# Patient Record
Sex: FEMALE
[De-identification: names, ages, dates, MRNs, and addresses within clinical notes are randomized per-mention and may not be internally consistent; named-entity substitution may affect disease eponyms.]

---

## 2022-07-20 ENCOUNTER — HOSPITAL ENCOUNTER (OUTPATIENT)
Dept: HOSPITAL 5 - SLR | Age: 37
Discharge: HOME | End: 2022-07-20
Attending: INTERNAL MEDICINE
Payer: COMMERCIAL

## 2022-07-20 DIAGNOSIS — G47.30: Primary | ICD-10-CM

## 2022-07-20 PROCEDURE — G0399 HOME SLEEP TEST/TYPE 3 PORTA: HCPCS

## 2024-06-19 ENCOUNTER — DASHBOARD ENCOUNTERS (OUTPATIENT)
Age: 39
End: 2024-06-19

## 2024-06-19 ENCOUNTER — OFFICE VISIT (OUTPATIENT)
Dept: URBAN - METROPOLITAN AREA CLINIC 92 | Facility: CLINIC | Age: 39
End: 2024-06-19
Payer: COMMERCIAL

## 2024-06-19 VITALS
HEART RATE: 77 BPM | BODY MASS INDEX: 43.4 KG/M2 | TEMPERATURE: 97 F | HEIGHT: 64 IN | SYSTOLIC BLOOD PRESSURE: 124 MMHG | DIASTOLIC BLOOD PRESSURE: 87 MMHG | WEIGHT: 254.2 LBS

## 2024-06-19 DIAGNOSIS — K62.5 RECTAL BLEEDING: ICD-10-CM

## 2024-06-19 DIAGNOSIS — K59.09 CHRONIC CONSTIPATION: ICD-10-CM

## 2024-06-19 PROCEDURE — 99204 OFFICE O/P NEW MOD 45 MIN: CPT | Performed by: INTERNAL MEDICINE

## 2024-06-19 RX ORDER — SEMAGLUTIDE 2.68 MG/ML
AS DIRECTED INJECTION, SOLUTION SUBCUTANEOUS
Status: ACTIVE | COMMUNITY

## 2024-06-19 RX ORDER — NAPROXEN SODIUM 220 MG/1
1 TABLET WITH FOOD OR MILK AS NEEDED TABLET ORAL
Status: ACTIVE | COMMUNITY

## 2024-06-19 RX ORDER — LEVOTHYROXINE SODIUM 200 UG/1
1 TABLET IN THE MORNING ON AN EMPTY STOMACH TABLET ORAL ONCE A DAY
Status: ACTIVE | COMMUNITY

## 2024-06-19 RX ORDER — LINACLOTIDE 145 UG/1
1 CAPSULE AT LEAST 30 MINUTES BEFORE THE FIRST MEAL OF THE DAY ON AN EMPTY STOMACH CAPSULE, GELATIN COATED ORAL ONCE A DAY
Qty: 30 | Refills: 11 | OUTPATIENT
Start: 2024-06-19 | End: 2025-06-14

## 2024-06-19 NOTE — HPI-TODAY'S VISIT:
This is a 38-year-old female presents today for eval ration of constipation.  She is referred by Gretchen Mena MD. a copy of this report was sent to her office.  Patient notes that she has chronic constipation.  She although has had worsening in the last 2 years since her gastric sleeve procedure.  She will have a bowel movement every 2 weeks.  She has significant straining with each bowel movement.  She has tried ballerina tea, magnesium citrate, Dulcolax, MiraLAX with no relief.  She does have occasional blood in stool.  She has no weight loss.  She has not previously had a colonoscopy.

## 2024-06-24 ENCOUNTER — OUT OF OFFICE VISIT (OUTPATIENT)
Dept: URBAN - METROPOLITAN AREA SURGERY CENTER 16 | Facility: SURGERY CENTER | Age: 39
End: 2024-06-24
Payer: COMMERCIAL

## 2024-06-24 ENCOUNTER — OFFICE VISIT (OUTPATIENT)
Dept: URBAN - METROPOLITAN AREA SURGERY CENTER 16 | Facility: SURGERY CENTER | Age: 39
End: 2024-06-24

## 2024-06-24 DIAGNOSIS — K57.30 DIVERTICULA, COLON: ICD-10-CM

## 2024-06-24 DIAGNOSIS — K59.00 CONSTIPATION: ICD-10-CM

## 2024-06-24 DIAGNOSIS — K64.8 HEMORRHOIDS, INTERNAL: ICD-10-CM

## 2024-06-24 DIAGNOSIS — K62.5 RECTAL BLEEDING: ICD-10-CM

## 2024-06-24 PROCEDURE — 00811 ANES LWR INTST NDSC NOS: CPT | Performed by: ANESTHESIOLOGIST ASSISTANT

## 2024-06-24 PROCEDURE — 00811 ANES LWR INTST NDSC NOS: CPT | Performed by: ANESTHESIOLOGY

## 2024-06-24 RX ORDER — NAPROXEN SODIUM 220 MG/1
1 TABLET WITH FOOD OR MILK AS NEEDED TABLET ORAL
Status: ACTIVE | COMMUNITY

## 2024-06-24 RX ORDER — SEMAGLUTIDE 2.68 MG/ML
AS DIRECTED INJECTION, SOLUTION SUBCUTANEOUS
Status: ACTIVE | COMMUNITY

## 2024-06-24 RX ORDER — LEVOTHYROXINE SODIUM 200 UG/1
1 TABLET IN THE MORNING ON AN EMPTY STOMACH TABLET ORAL ONCE A DAY
Status: ACTIVE | COMMUNITY

## 2024-06-24 RX ORDER — LINACLOTIDE 145 UG/1
1 CAPSULE AT LEAST 30 MINUTES BEFORE THE FIRST MEAL OF THE DAY ON AN EMPTY STOMACH CAPSULE, GELATIN COATED ORAL ONCE A DAY
Qty: 30 | Refills: 11 | Status: ACTIVE | COMMUNITY
Start: 2024-06-19 | End: 2025-06-14

## 2025-01-05 ENCOUNTER — ERX REFILL RESPONSE (OUTPATIENT)
Dept: URBAN - METROPOLITAN AREA CLINIC 92 | Facility: CLINIC | Age: 40
End: 2025-01-05

## 2025-01-05 RX ORDER — LINACLOTIDE 145 UG/1
TAKE 1 CAPSULE BY MOUTH DAILY AT LEAST 30 MIN BEFORE TGE FIRST MEAL OF THE DAY ON A EMPTY STOMACH CAPSULE, GELATIN COATED ORAL
Qty: 90 CAPSULE | Refills: 4 | OUTPATIENT

## 2025-01-05 RX ORDER — LINACLOTIDE 145 UG/1
1 CAPSULE AT LEAST 30 MINUTES BEFORE THE FIRST MEAL OF THE DAY ON AN EMPTY STOMACH CAPSULE, GELATIN COATED ORAL ONCE A DAY
Qty: 90 | Refills: 3 | OUTPATIENT

## 2025-01-13 ENCOUNTER — OFFICE VISIT (OUTPATIENT)
Dept: URBAN - METROPOLITAN AREA CLINIC 92 | Facility: CLINIC | Age: 40
End: 2025-01-13
Payer: COMMERCIAL

## 2025-01-13 VITALS
DIASTOLIC BLOOD PRESSURE: 78 MMHG | TEMPERATURE: 97.2 F | SYSTOLIC BLOOD PRESSURE: 118 MMHG | BODY MASS INDEX: 45.07 KG/M2 | HEIGHT: 64 IN | WEIGHT: 264 LBS | HEART RATE: 77 BPM

## 2025-01-13 DIAGNOSIS — K59.09 CHRONIC CONSTIPATION: ICD-10-CM

## 2025-01-13 DIAGNOSIS — R10.13 EPIGASTRIC PAIN: ICD-10-CM

## 2025-01-13 DIAGNOSIS — K80.20 CALCULUS OF GALLBLADDER WITHOUT CHOLECYSTITIS WITHOUT OBSTRUCTION: ICD-10-CM

## 2025-01-13 PROBLEM — 70342003: Status: ACTIVE | Noted: 2025-01-13

## 2025-01-13 PROCEDURE — 99214 OFFICE O/P EST MOD 30 MIN: CPT | Performed by: INTERNAL MEDICINE

## 2025-01-13 RX ORDER — LEVOTHYROXINE SODIUM 200 UG/1
1 TABLET IN THE MORNING ON AN EMPTY STOMACH TABLET ORAL ONCE A DAY
Status: ACTIVE | COMMUNITY

## 2025-01-13 RX ORDER — LINACLOTIDE 145 UG/1
1 CAPSULE AT LEAST 30 MINUTES BEFORE THE FIRST MEAL OF THE DAY ON AN EMPTY STOMACH CAPSULE, GELATIN COATED ORAL ONCE A DAY
Qty: 90 | Refills: 3 | Status: ACTIVE | COMMUNITY

## 2025-01-13 RX ORDER — NAPROXEN SODIUM 220 MG
1 TABLET WITH FOOD OR MILK AS NEEDED CAPSULE ORAL
Status: ACTIVE | COMMUNITY

## 2025-01-13 RX ORDER — LINACLOTIDE 72 UG/1
1 CAPSULE AT LEAST 30 MINUTES BEFORE THE FIRST MEAL OF THE DAY ON AN EMPTY STOMACH CAPSULE, GELATIN COATED ORAL ONCE A DAY
Qty: 30 | Refills: 11 | OUTPATIENT

## 2025-01-13 RX ORDER — TIRZEPATIDE 7.5 MG/.5ML
AS DIRECTED INJECTION, SOLUTION SUBCUTANEOUS
Status: ACTIVE | COMMUNITY

## 2025-01-13 NOTE — HPI-TODAY'S VISIT:
This a 39-year-old female presents today for hospital follow-up.  She notes that she been having epigastric and chest pain.  This is associated with nausea vomiting.  It occurs an hour or so after eating and can last up to 4 hours.  She states it is worse with spicy food or with fatty foods.  She has tried to take Tums before meals this is ineffective.  She notes that while in the emergency room she had an ultrasound which showed cholelithiasis.  She does have chronic constipation she takes Linzess as needed as her dose resulted in diarrhea.  No prior EGD

## 2025-02-11 ENCOUNTER — CLAIMS CREATED FROM THE CLAIM WINDOW (OUTPATIENT)
Dept: URBAN - METROPOLITAN AREA SURGERY CENTER 16 | Facility: SURGERY CENTER | Age: 40
End: 2025-02-11
Payer: COMMERCIAL

## 2025-02-11 DIAGNOSIS — K30 DYSPEPSIA: ICD-10-CM

## 2025-02-11 DIAGNOSIS — K20.80 ESOPHAGITIS, LOS ANGELES GRADE A: ICD-10-CM

## 2025-02-11 DIAGNOSIS — B96.81 BACTERIAL INFECTION DUE TO H. PYLORI: ICD-10-CM

## 2025-02-11 DIAGNOSIS — K29.60 OTHER GASTRITIS WITHOUT BLEEDING: ICD-10-CM

## 2025-02-11 PROCEDURE — 43239 EGD BIOPSY SINGLE/MULTIPLE: CPT | Performed by: INTERNAL MEDICINE

## 2025-02-11 PROCEDURE — 00731 ANES UPR GI NDSC PX NOS: CPT | Performed by: ANESTHESIOLOGIST ASSISTANT

## 2025-02-11 PROCEDURE — 00731 ANES UPR GI NDSC PX NOS: CPT | Performed by: ANESTHESIOLOGY

## 2025-02-11 RX ORDER — TIRZEPATIDE 7.5 MG/.5ML
AS DIRECTED INJECTION, SOLUTION SUBCUTANEOUS
Status: ACTIVE | COMMUNITY

## 2025-02-11 RX ORDER — LINACLOTIDE 145 UG/1
1 CAPSULE AT LEAST 30 MINUTES BEFORE THE FIRST MEAL OF THE DAY ON AN EMPTY STOMACH CAPSULE, GELATIN COATED ORAL ONCE A DAY
Qty: 90 | Refills: 3 | Status: ACTIVE | COMMUNITY

## 2025-02-11 RX ORDER — LEVOTHYROXINE SODIUM 200 UG/1
1 TABLET IN THE MORNING ON AN EMPTY STOMACH TABLET ORAL ONCE A DAY
Status: ACTIVE | COMMUNITY

## 2025-02-11 RX ORDER — NAPROXEN SODIUM 220 MG
1 TABLET WITH FOOD OR MILK AS NEEDED CAPSULE ORAL
Status: ACTIVE | COMMUNITY

## 2025-02-11 RX ORDER — LINACLOTIDE 72 UG/1
1 CAPSULE AT LEAST 30 MINUTES BEFORE THE FIRST MEAL OF THE DAY ON AN EMPTY STOMACH CAPSULE, GELATIN COATED ORAL ONCE A DAY
Qty: 30 | Refills: 11 | Status: ACTIVE | COMMUNITY

## 2025-03-04 ENCOUNTER — TELEPHONE ENCOUNTER (OUTPATIENT)
Dept: URBAN - METROPOLITAN AREA CLINIC 105 | Facility: CLINIC | Age: 40
End: 2025-03-04

## 2025-03-07 ENCOUNTER — TELEPHONE ENCOUNTER (OUTPATIENT)
Dept: URBAN - METROPOLITAN AREA CLINIC 92 | Facility: CLINIC | Age: 40
End: 2025-03-07

## 2025-04-14 ENCOUNTER — TELEPHONE ENCOUNTER (OUTPATIENT)
Dept: URBAN - METROPOLITAN AREA CLINIC 17 | Facility: CLINIC | Age: 40
End: 2025-04-14

## 2025-04-14 ENCOUNTER — OFFICE VISIT (OUTPATIENT)
Dept: URBAN - METROPOLITAN AREA CLINIC 17 | Facility: CLINIC | Age: 40
End: 2025-04-14
Payer: COMMERCIAL

## 2025-04-14 DIAGNOSIS — K59.09 CHRONIC CONSTIPATION: ICD-10-CM

## 2025-04-14 DIAGNOSIS — A04.8 H. PYLORI INFECTION: ICD-10-CM

## 2025-04-14 DIAGNOSIS — Z90.3 S/P GASTRIC SLEEVE PROCEDURE: ICD-10-CM

## 2025-04-14 DIAGNOSIS — E66.01 MORBID OBESITY: ICD-10-CM

## 2025-04-14 PROBLEM — 329281000119107: Status: ACTIVE | Noted: 2025-04-14

## 2025-04-14 PROBLEM — 238136002: Status: ACTIVE | Noted: 2025-04-14

## 2025-04-14 PROCEDURE — 99214 OFFICE O/P EST MOD 30 MIN: CPT | Performed by: INTERNAL MEDICINE

## 2025-04-14 RX ORDER — TIRZEPATIDE 7.5 MG/.5ML
AS DIRECTED INJECTION, SOLUTION SUBCUTANEOUS
Status: ACTIVE | COMMUNITY

## 2025-04-14 RX ORDER — LINACLOTIDE 145 UG/1
1 CAPSULE AT LEAST 30 MINUTES BEFORE THE FIRST MEAL OF THE DAY ON AN EMPTY STOMACH CAPSULE, GELATIN COATED ORAL ONCE A DAY
Qty: 90 | Refills: 3 | Status: ACTIVE | COMMUNITY

## 2025-04-14 RX ORDER — NAPROXEN SODIUM 220 MG
1 TABLET WITH FOOD OR MILK AS NEEDED CAPSULE ORAL
Status: ACTIVE | COMMUNITY

## 2025-04-14 RX ORDER — LINACLOTIDE 145 UG/1
1 CAPSULE AT LEAST 30 MINUTES BEFORE THE FIRST MEAL OF THE DAY ON AN EMPTY STOMACH CAPSULE, GELATIN COATED ORAL ONCE A DAY
Qty: 90 | Refills: 3
End: 2026-04-09

## 2025-04-14 RX ORDER — LEVOTHYROXINE SODIUM 200 UG/1
1 TABLET IN THE MORNING ON AN EMPTY STOMACH TABLET ORAL ONCE A DAY
Status: ACTIVE | COMMUNITY

## 2025-04-14 RX ORDER — LINACLOTIDE 72 UG/1
1 CAPSULE AT LEAST 30 MINUTES BEFORE THE FIRST MEAL OF THE DAY ON AN EMPTY STOMACH CAPSULE, GELATIN COATED ORAL ONCE A DAY
Qty: 30 | Refills: 11 | Status: ACTIVE | COMMUNITY

## 2025-04-14 NOTE — HPI-TODAY'S VISIT:
The patient is 38 y/o AAF with bistory of obesity, sp gastric sleeve in 2022, gastritis, HP and duodenitis and colon in 6/2024 + for tics. She has struggled with constipation and has been on low ddose Lnzess 72mcg daily and she is not exercising on a regular basis and is not eating health as she should.  The pt's time of vist DOS is 35 minutss after reivew of the old records and op notes.

## 2025-04-15 LAB — H PYLORI BREATH TEST: NOT DETECTED

## 2025-06-09 ENCOUNTER — OFFICE VISIT (OUTPATIENT)
Dept: URBAN - METROPOLITAN AREA CLINIC 17 | Facility: CLINIC | Age: 40
End: 2025-06-09

## 2025-06-09 RX ORDER — LINACLOTIDE 72 UG/1
1 CAPSULE AT LEAST 30 MINUTES BEFORE THE FIRST MEAL OF THE DAY ON AN EMPTY STOMACH CAPSULE, GELATIN COATED ORAL ONCE A DAY
Qty: 30 | Refills: 11 | Status: ACTIVE | COMMUNITY

## 2025-06-09 RX ORDER — NAPROXEN SODIUM 220 MG
1 TABLET WITH FOOD OR MILK AS NEEDED CAPSULE ORAL
Status: ACTIVE | COMMUNITY

## 2025-06-09 RX ORDER — LINACLOTIDE 145 UG/1
1 CAPSULE AT LEAST 30 MINUTES BEFORE THE FIRST MEAL OF THE DAY ON AN EMPTY STOMACH CAPSULE, GELATIN COATED ORAL ONCE A DAY
Qty: 90 | Refills: 3 | Status: ACTIVE | COMMUNITY
End: 2026-04-09

## 2025-06-09 RX ORDER — LEVOTHYROXINE SODIUM 200 UG/1
1 TABLET IN THE MORNING ON AN EMPTY STOMACH TABLET ORAL ONCE A DAY
Status: ACTIVE | COMMUNITY

## 2025-06-09 RX ORDER — TIRZEPATIDE 7.5 MG/.5ML
AS DIRECTED INJECTION, SOLUTION SUBCUTANEOUS
Status: ACTIVE | COMMUNITY